# Patient Record
Sex: MALE | Race: BLACK OR AFRICAN AMERICAN
[De-identification: names, ages, dates, MRNs, and addresses within clinical notes are randomized per-mention and may not be internally consistent; named-entity substitution may affect disease eponyms.]

---

## 2018-11-03 ENCOUNTER — HOSPITAL ENCOUNTER (EMERGENCY)
Dept: HOSPITAL 74 - JER | Age: 48
Discharge: HOME | End: 2018-11-03
Payer: COMMERCIAL

## 2018-11-03 VITALS — SYSTOLIC BLOOD PRESSURE: 110 MMHG | HEART RATE: 71 BPM | TEMPERATURE: 98.1 F | DIASTOLIC BLOOD PRESSURE: 67 MMHG

## 2018-11-03 VITALS — BODY MASS INDEX: 31.4 KG/M2

## 2018-11-03 DIAGNOSIS — R07.89: Primary | ICD-10-CM

## 2018-11-03 LAB
ALBUMIN SERPL-MCNC: 4 G/DL (ref 3.4–5)
ALP SERPL-CCNC: 48 U/L (ref 45–117)
ALT SERPL-CCNC: 31 U/L (ref 13–61)
ANION GAP SERPL CALC-SCNC: 7 MMOL/L (ref 8–16)
APPEARANCE UR: CLEAR
AST SERPL-CCNC: 23 U/L (ref 15–37)
BASOPHILS # BLD: 0.6 % (ref 0–2)
BILIRUB SERPL-MCNC: 0.4 MG/DL (ref 0.2–1)
BILIRUB UR STRIP.AUTO-MCNC: NEGATIVE MG/DL
BUN SERPL-MCNC: 11 MG/DL (ref 7–18)
CALCIUM SERPL-MCNC: 8.8 MG/DL (ref 8.5–10.1)
CHLORIDE SERPL-SCNC: 108 MMOL/L (ref 98–107)
CO2 SERPL-SCNC: 26 MMOL/L (ref 21–32)
COLOR UR: (no result)
CREAT SERPL-MCNC: 0.9 MG/DL (ref 0.55–1.3)
DEPRECATED RDW RBC AUTO: 14.1 % (ref 11.9–15.9)
EOSINOPHIL # BLD: 3 % (ref 0–4.5)
GLUCOSE SERPL-MCNC: 81 MG/DL (ref 74–106)
HCT VFR BLD CALC: 41.2 % (ref 35.4–49)
HGB BLD-MCNC: 14.4 GM/DL (ref 11.7–16.9)
KETONES UR QL STRIP: NEGATIVE
LEUKOCYTE ESTERASE UR QL STRIP.AUTO: NEGATIVE
LYMPHOCYTES # BLD: 44 % (ref 8–40)
MCH RBC QN AUTO: 28.8 PG (ref 25.7–33.7)
MCHC RBC AUTO-ENTMCNC: 34.8 G/DL (ref 32–35.9)
MCV RBC: 82.7 FL (ref 80–96)
MONOCYTES # BLD AUTO: 8.3 % (ref 3.8–10.2)
NEUTROPHILS # BLD: 44.1 % (ref 42.8–82.8)
NITRITE UR QL STRIP: NEGATIVE
PH UR: 6 [PH] (ref 5–8)
PLATELET # BLD AUTO: 270 K/MM3 (ref 134–434)
PMV BLD: 6.7 FL (ref 7.5–11.1)
POTASSIUM SERPLBLD-SCNC: 4 MMOL/L (ref 3.5–5.1)
PROT SERPL-MCNC: 7.4 G/DL (ref 6.4–8.2)
PROT UR QL STRIP: NEGATIVE
PROT UR QL STRIP: NEGATIVE
RBC # BLD AUTO: 4.98 M/MM3 (ref 4–5.6)
SODIUM SERPL-SCNC: 140 MMOL/L (ref 136–145)
SP GR UR: 1.01 (ref 1.01–1.03)
UROBILINOGEN UR STRIP-MCNC: NEGATIVE MG/DL (ref 0.2–1)
WBC # BLD AUTO: 5.1 K/MM3 (ref 4–10)

## 2018-11-03 NOTE — PDOC
History of Present Illness





- General


Chief Complaint: Chest Pain


Stated Complaint: CHEST PAIN


Time Seen by Provider: 11/03/18 12:35


History Source: Patient


Exam Limitations: No Limitations





- History of Present Illness


Initial Comments: 





11/03/18 12:56


47 yo male with pmh of HLD, HTN and GERD presents to the ed with 7 days of CP. 

Pain is located midline of the sternum at the level of the 8th rib, non 

exertional, non radiating, only made worse with palpation, non plueritic. 

Denies N/V/F/C, SOB, abdominal pain. Of note patient states he has not seen his 

PCP or taken any of his medications for over 1 year. Pt was also followed by a 

Cardiologist for an unknown reason over 2 years ago. 





Past History





- Past Medical History


Allergies/Adverse Reactions: 


 Allergies











Allergy/AdvReac Type Severity Reaction Status Date / Time


 


No Known Allergies Allergy   Verified 11/03/18 12:07











Home Medications: 


Ambulatory Orders





NK [No Known Home Medication]  11/03/18 








COPD: No





- Immunization History


Td Vaccination:  (unknown)


Immunization Up to Date:  (unknown)





- Suicide/Smoking/Psychosocial Hx


Smoking Status: No


Smoking History: Never smoked


Years of Tobacco Use: 0


Number of Cigarettes Smoked Daily: 0


Cigars Per Day: 0


Hx Alcohol Use:  (former heavy drinker)


Drug/Substance Use Hx: Yes


Substance Use Type: Alcohol





**Review of Systems





- Review of Systems


Constitutional: No: Chills, Fever


Respiratory: No: Shortness of Breath


Cardiac (ROS): Yes: Chest Pain.  No: Edema, Palpitations


ABD/GI: No: Constipated, Diarrhea, Nausea, Vomiting


: No: Burning, Dysuria


Musculoskeletal: No: Back Pain





*Physical Exam





- Vital Signs


 Last Vital Signs











Temp Pulse Resp BP Pulse Ox


 


 97.9 F   68   18   105/66   98 


 


 11/03/18 12:03  11/03/18 12:03  11/03/18 12:03  11/03/18 12:03  11/03/18 12:03














- Physical Exam


General Appearance: Yes: Nourished, Appropriately Dressed.  No: Apparent 

Distress


HEENT: positive: EOMI


Respiratory/Chest: positive: Lungs Clear, Normal Breath Sounds.  negative: 

Respiratory Distress, Crackles, Rales, Rhonchi, Wheezing


Cardiovascular: positive: Regular Rhythm, Regular Rate, S1, S2.  negative: Edema

, JVD, Murmur


Vascular Pulses: Dorsalis-Pedis (R): 4+, Doralis-Pedis (L): 4+


Gastrointestinal/Abdominal: positive: Normal Bowel Sounds, Flat, Soft.  negative

: Distended, Guarding, Rebound, Tenderness


Extremity: positive: Normal Capillary Refill


Integumentary: positive: Normal Color, Dry, Warm


Neurologic: positive: Fully Oriented, Alert.  negative: Normal Mood/Affect (

possible MR)





ED Treatment Course





- LABORATORY


CBC & Chemistry Diagram: 


 11/03/18 14:24





 11/03/18 13:52





- RADIOLOGY


Radiology Studies Ordered: 














 Category Date Time Status


 


 CHEST PA & LAT [RAD] Stat Radiology  11/03/18 12:54 Ordered














Medical Decision Making





- Medical Decision Making





11/03/18 13:23


47 yo male pmh uncertain, patient states has not taken any medications or seen 

his in over 1 year also has seen Cardiology for unknown reason over 2 years ago 

presents with chest pain only on palpation.





Due to uncertainty of hx, cardiac workup including 1 trop done





DDX: GERD, costochondritis, ACS (unlikely due to story)





EKG shows sinus corinne





Chest x ray no acute path





negative trop





Will send home with follow up at [facility] and strict return 

precautions





*DC/Admit/Observation/Transfer


Diagnosis at time of Disposition: 


Chest pain


Qualifiers:


 Chest pain type: other chest pain Qualified Code(s): R07.89 - Other chest pain








- Discharge Dispostion


Disposition: HOME


Decision to Admit order: No





- Referrals


Referrals: 


[facility] [place] [Provider Group]





- Patient Instructions


Printed Discharge Instructions:  DI for Atypical Chest Pain


Additional Instructions: 


Please make appointment with the clinic referred to you within the next 2 days.





Please return to the emergency department for new or concerning symptoms 

including but not limited to: severe chest pain not made better with rest, 

shortness of breath, nausea or vomiting.





Please take over the counter Motrin for your pain





Thank you





- Post Discharge Activity

## 2018-11-03 NOTE — PDOC
Attending Attestation





- Resident


Resident Name: Franki Whitt





- HPI


HPI: 





11/03/18 14:32


Pt presents to the ED complaining of a one week history of substernal chest 

pain that only occurs when he touches his chest wall.  Denies shortness of 

breath.  Pain is non exertional and non radiating.  Patient states that he has 

no pain at all unless he is touching his chest wall.  Does report that he often 

feels chest discomfort. 





- Physicial Exam


PE: 





11/03/18 14:36


Agree with resident exam.  Patient is alert and oriented and in no acute 

distress.  + point tenderness in chest wall.  Lungs are clear.  HEart has 

regular rate and rhythm. 





- Medical Decision Making





11/03/18 14:39


Pt presents to the ED complaining of atypical chest pain x 1 week.  EKG shows 

no evidence of ischemia.  Will check labs and discharge home if labs are 

negative.

## 2018-11-04 NOTE — EKG
Test Reason : 

Blood Pressure : ***/*** mmHG

Vent. Rate : 049 BPM     Atrial Rate : 049 BPM

   P-R Int : 160 ms          QRS Dur : 082 ms

    QT Int : 436 ms       P-R-T Axes : 051 -22 -12 degrees

   QTc Int : 393 ms

 

SINUS BRADYCARDIA

NONSPECIFIC ST ABNORMALITY

NO PREVIOUS ECGS AVAILABLE

Confirmed by TONEY MARINO MD ([id number]) on 11/4/2018 11:46:16 AM

 

Referred By:             Confirmed By:TONEY MARINO MD

## 2022-11-17 ENCOUNTER — HOSPITAL ENCOUNTER (OUTPATIENT)
Dept: HOSPITAL 74 - JASU-ENDO | Age: 52
Discharge: HOME | End: 2022-11-17
Attending: INTERNAL MEDICINE
Payer: COMMERCIAL

## 2022-11-17 VITALS — DIASTOLIC BLOOD PRESSURE: 64 MMHG | SYSTOLIC BLOOD PRESSURE: 101 MMHG | RESPIRATION RATE: 22 BRPM | HEART RATE: 85 BPM

## 2022-11-17 VITALS — TEMPERATURE: 98 F

## 2022-11-17 VITALS — BODY MASS INDEX: 33 KG/M2

## 2022-11-17 DIAGNOSIS — K57.30: ICD-10-CM

## 2022-11-17 DIAGNOSIS — D12.2: ICD-10-CM

## 2022-11-17 DIAGNOSIS — K64.8: ICD-10-CM

## 2022-11-17 DIAGNOSIS — Z12.11: Primary | ICD-10-CM

## 2022-11-17 PROCEDURE — 0DBK8ZX EXCISION OF ASCENDING COLON, VIA NATURAL OR ARTIFICIAL OPENING ENDOSCOPIC, DIAGNOSTIC: ICD-10-PCS | Performed by: INTERNAL MEDICINE

## 2023-04-14 ENCOUNTER — HOSPITAL ENCOUNTER (EMERGENCY)
Dept: HOSPITAL 74 - JER | Age: 53
Discharge: HOME | End: 2023-04-14
Payer: COMMERCIAL

## 2023-04-14 VITALS — DIASTOLIC BLOOD PRESSURE: 76 MMHG | HEART RATE: 80 BPM | SYSTOLIC BLOOD PRESSURE: 120 MMHG | TEMPERATURE: 98 F

## 2023-04-14 VITALS — BODY MASS INDEX: 33.2 KG/M2

## 2023-04-14 VITALS — RESPIRATION RATE: 18 BRPM

## 2023-04-14 DIAGNOSIS — Z20.822: ICD-10-CM

## 2023-04-14 DIAGNOSIS — R51.9: Primary | ICD-10-CM

## 2023-04-14 DIAGNOSIS — R22.1: ICD-10-CM

## 2023-04-14 LAB
ALBUMIN SERPL-MCNC: 3.8 G/DL (ref 3.4–5)
ALP SERPL-CCNC: 45 U/L (ref 45–117)
ALT SERPL-CCNC: 27 U/L (ref 13–61)
ANION GAP SERPL CALC-SCNC: 3 MMOL/L (ref 8–16)
APTT BLD: 31.2 SECONDS (ref 25.2–36.5)
AST SERPL-CCNC: 16 U/L (ref 15–37)
BASOPHILS # BLD: 0.3 % (ref 0–2)
BILIRUB DIRECT SERPL-MCNC: 207 U/L (ref 87–246)
BILIRUB SERPL-MCNC: 0.4 MG/DL (ref 0.2–1)
BUN SERPL-MCNC: 9.8 MG/DL (ref 7–18)
CALCIUM SERPL-MCNC: 8.9 MG/DL (ref 8.5–10.1)
CHLORIDE SERPL-SCNC: 110 MMOL/L (ref 98–107)
CO2 SERPL-SCNC: 29 MMOL/L (ref 21–32)
CREAT SERPL-MCNC: 1.2 MG/DL (ref 0.55–1.3)
DEPRECATED RDW RBC AUTO: 14.2 % (ref 11.9–15.9)
EOSINOPHIL # BLD: 2.6 % (ref 0–4.5)
GLUCOSE SERPL-MCNC: 128 MG/DL (ref 74–106)
HCT VFR BLD CALC: 40.6 % (ref 35.4–49)
HGB BLD-MCNC: 13.9 GM/DL (ref 11.7–16.9)
INR BLD: 1.05 (ref 0.83–1.09)
LYMPHOCYTES # BLD: 33.9 % (ref 8–40)
MCH RBC QN AUTO: 28.1 PG (ref 25.7–33.7)
MCHC RBC AUTO-ENTMCNC: 34.1 G/DL (ref 32–35.9)
MCV RBC: 82.4 FL (ref 80–96)
MONOCYTES # BLD AUTO: 9.3 % (ref 3.8–10.2)
NEUTROPHILS # BLD: 53.9 % (ref 42.8–82.8)
PLATELET # BLD AUTO: 292 10^3/UL (ref 134–434)
PMV BLD: 7 FL (ref 7.5–11.1)
PROT SERPL-MCNC: 6.8 G/DL (ref 6.4–8.2)
PT PNL PPP: 12.2 SEC (ref 9.7–13)
RBC # BLD AUTO: 4.94 M/MM3 (ref 4–5.6)
SODIUM SERPL-SCNC: 142 MMOL/L (ref 136–145)
URATE SERPL-SCNC: 6.1 MG/DL (ref 2.6–7.2)
WBC # BLD AUTO: 5.1 K/MM3 (ref 4–10)

## 2024-08-26 ENCOUNTER — HOSPITAL ENCOUNTER (EMERGENCY)
Dept: HOSPITAL 74 - JERFT | Age: 54
Discharge: HOME | End: 2024-08-26
Payer: COMMERCIAL

## 2024-08-26 VITALS
DIASTOLIC BLOOD PRESSURE: 83 MMHG | SYSTOLIC BLOOD PRESSURE: 119 MMHG | RESPIRATION RATE: 18 BRPM | HEART RATE: 64 BPM | TEMPERATURE: 97.7 F

## 2024-08-26 VITALS — BODY MASS INDEX: 31.9 KG/M2

## 2024-08-26 DIAGNOSIS — M54.2: Primary | ICD-10-CM

## 2024-08-26 PROCEDURE — 3E0133Z INTRODUCTION OF ANTI-INFLAMMATORY INTO SUBCUTANEOUS TISSUE, PERCUTANEOUS APPROACH: ICD-10-PCS

## 2024-08-26 RX ADMIN — ACETAMINOPHEN ONE MG: 500 TABLET, FILM COATED ORAL at 14:29

## 2024-08-26 RX ADMIN — KETOROLAC TROMETHAMINE ONE MG: 30 INJECTION INTRAMUSCULAR; INTRAVENOUS at 14:29

## 2024-08-26 RX ADMIN — METHOCARBAMOL ONE MG: 500 TABLET ORAL at 14:29

## 2024-08-26 RX ADMIN — LIDOCAINE PATCH 4% ONE PATCH: 40 PATCH TOPICAL at 14:29
